# Patient Record
Sex: MALE | Race: WHITE
[De-identification: names, ages, dates, MRNs, and addresses within clinical notes are randomized per-mention and may not be internally consistent; named-entity substitution may affect disease eponyms.]

---

## 2017-01-16 ENCOUNTER — HOSPITAL ENCOUNTER (OUTPATIENT)
Dept: HOSPITAL 61 - PCVCCLINIC | Age: 77
Discharge: HOME | End: 2017-01-16
Attending: INTERNAL MEDICINE
Payer: COMMERCIAL

## 2017-01-16 DIAGNOSIS — I47.2: ICD-10-CM

## 2017-01-16 DIAGNOSIS — I25.5: ICD-10-CM

## 2017-01-16 DIAGNOSIS — I73.9: ICD-10-CM

## 2017-01-16 DIAGNOSIS — E78.00: ICD-10-CM

## 2017-01-16 DIAGNOSIS — I25.10: Primary | ICD-10-CM

## 2017-01-16 DIAGNOSIS — I10: ICD-10-CM

## 2017-01-16 DIAGNOSIS — I48.91: ICD-10-CM

## 2017-01-16 PROCEDURE — 93005 ELECTROCARDIOGRAM TRACING: CPT

## 2017-01-16 PROCEDURE — 80061 LIPID PANEL: CPT

## 2017-01-16 PROCEDURE — G0463 HOSPITAL OUTPT CLINIC VISIT: HCPCS

## 2017-05-22 ENCOUNTER — HOSPITAL ENCOUNTER (OUTPATIENT)
Dept: HOSPITAL 35 - GI | Age: 77
Discharge: HOME | End: 2017-05-22
Attending: SPECIALIST
Payer: COMMERCIAL

## 2017-05-22 VITALS — HEIGHT: 69.02 IN | BODY MASS INDEX: 36.58 KG/M2 | WEIGHT: 247 LBS

## 2017-05-22 DIAGNOSIS — F41.9: ICD-10-CM

## 2017-05-22 DIAGNOSIS — Z95.0: ICD-10-CM

## 2017-05-22 DIAGNOSIS — K63.5: ICD-10-CM

## 2017-05-22 DIAGNOSIS — I50.9: ICD-10-CM

## 2017-05-22 DIAGNOSIS — G47.33: ICD-10-CM

## 2017-05-22 DIAGNOSIS — Z86.010: ICD-10-CM

## 2017-05-22 DIAGNOSIS — I25.10: ICD-10-CM

## 2017-05-22 DIAGNOSIS — I10: ICD-10-CM

## 2017-05-22 DIAGNOSIS — Z12.11: Primary | ICD-10-CM

## 2017-05-22 DIAGNOSIS — Z87.891: ICD-10-CM

## 2017-05-22 DIAGNOSIS — K57.30: ICD-10-CM

## 2017-05-22 DIAGNOSIS — I25.2: ICD-10-CM

## 2017-05-22 DIAGNOSIS — F32.9: ICD-10-CM

## 2017-05-22 DIAGNOSIS — I73.9: ICD-10-CM

## 2017-05-22 DIAGNOSIS — Z98.890: ICD-10-CM

## 2017-05-22 DIAGNOSIS — E78.5: ICD-10-CM

## 2017-05-22 PROCEDURE — 62110: CPT

## 2017-05-22 PROCEDURE — 62900: CPT

## 2017-07-20 ENCOUNTER — HOSPITAL ENCOUNTER (OUTPATIENT)
Dept: HOSPITAL 61 - PCVCIMAG | Age: 77
Discharge: HOME | End: 2017-07-20
Attending: INTERNAL MEDICINE
Payer: COMMERCIAL

## 2017-07-20 DIAGNOSIS — Z87.891: ICD-10-CM

## 2017-07-20 DIAGNOSIS — I71.4: ICD-10-CM

## 2017-07-20 DIAGNOSIS — I25.10: ICD-10-CM

## 2017-07-20 DIAGNOSIS — Z95.1: ICD-10-CM

## 2017-07-20 DIAGNOSIS — I73.9: ICD-10-CM

## 2017-07-20 DIAGNOSIS — Z95.810: ICD-10-CM

## 2017-07-20 DIAGNOSIS — I47.2: ICD-10-CM

## 2017-07-20 DIAGNOSIS — I48.0: ICD-10-CM

## 2017-07-20 DIAGNOSIS — I34.0: Primary | ICD-10-CM

## 2017-07-20 DIAGNOSIS — Z79.01: ICD-10-CM

## 2017-07-20 PROCEDURE — 93005 ELECTROCARDIOGRAM TRACING: CPT

## 2017-07-20 PROCEDURE — G0463 HOSPITAL OUTPT CLINIC VISIT: HCPCS

## 2017-07-20 PROCEDURE — 80061 LIPID PANEL: CPT

## 2017-07-20 PROCEDURE — 93306 TTE W/DOPPLER COMPLETE: CPT

## 2017-07-20 NOTE — PCVCIMAG
--------------- APPROVED REPORT --------------





Study performed:  07/20/2017 08:51:01



EXAM: Comprehensive 2D, Doppler, and color-flow 

Echocardiogram



Other Information 

Study Quality: Technically 

Limited



Indications

CAD

Hypertension/HDD

CABG, Defib, Sleep Apnea



2D Dimensions

LVEF(%):  15.12 (&gt;50%)

IVSd:  12.36 (7-11mm)LVOT Diam:  26.93 (18-24mm) 

LVDd:  70.36 mm

PWd:  8.18 (7-11mm)

LVDs:  65.44 (25-40mm)

Left Atrium:  52.01 (27-40mm)

Aortic Root:  39.13 mm

LV Single Plane 4CH:  13.08 %

LV Single Plane 2CH:  28.18 %Smith's LVEF:  20.63 %

Biplane EF:  24.1 %



Volumes

Left Atrial Volume (Systole)

Single Plane 4CH:  53.25 mLSingle Plane 2CH:  61.19 mL

LA ESV Index:  24.00 mL/m2



Aortic Valve

AoV Peak Mohamud.:  1.22 m/s

AO Peak Gr.:  5.97 mmHgLVOT Max PG:  3.93 mmHg

LVOT Max V:  0.96 m/s

DANNY Vmax: 4.46 cm2



Mitral Valve

E/A Ratio:  0.0

MV Decel. Time:  180.15 ms

MV E Max Mohamud.:  0.62 m/s

MV A Mohamud.:  0.00 m/s

IVRT:  114.19 ms



Tricuspid Valve

TR Peak Mohamud.:  3.02 m/s

TR Peak Gr.:  36.37 mmHg



Left Ventricle

The left ventricle is dilated. There is normal left ventricular wall 

thickness. Left ventricular ejection fraction is severely decreased. 

LVEF is 30-35%. Grade I diastolic dysfunction



Right Ventricle

The right ventricle is normal size. Pacemaker wire is noted. The 

right ventricular systolic function is normal.



Atria

The left atrium size is normal. The right atrium size is normal. 

Pacemaker wire is noted.



Aortic Valve

The aortic valve is normal in structure. No aortic regurgitation is 

present. There is no aortic valvular stenosis.



Mitral Valve

The mitral valve is normal in structure. Mild mitral valve 

regurgitation No evidence of mitral valve stenosis.



Tricuspid Valve

The tricuspid valve is normal in structure. There is no tricuspid 

valve regurgitation noted.



Pulmonic Valve

The pulmonary valve is normal in structure. There is no pulmonic 

valvular regurgitation.



Great Vessels

The aortic root is normal in size. IVC is normal in size and 

collapses with &gt;50% inspiration



Pericardium

There is no pericardial effusion.



&lt;Conclusion&gt;

Left ventricular ejection fraction is severely decreased.

LVEF is 30-35%. Grade I diastolic dysfunction

The aortic valve is normal in structure. No aortic regurgitation or 

stenosis.

The mitral valve is normal in structure. Mild mitral valve 

regurgitation

Pulmonary artery pressure could not be reliably ascertained

There is no pericardial effusion.

## 2018-01-25 ENCOUNTER — HOSPITAL ENCOUNTER (OUTPATIENT)
Dept: HOSPITAL 61 - PCVCCLINIC | Age: 78
Discharge: HOME | End: 2018-01-25
Attending: INTERNAL MEDICINE
Payer: COMMERCIAL

## 2018-01-25 DIAGNOSIS — I47.2: ICD-10-CM

## 2018-01-25 DIAGNOSIS — Z95.810: ICD-10-CM

## 2018-01-25 DIAGNOSIS — I25.5: ICD-10-CM

## 2018-01-25 DIAGNOSIS — Z87.891: ICD-10-CM

## 2018-01-25 DIAGNOSIS — Z79.899: ICD-10-CM

## 2018-01-25 DIAGNOSIS — G47.33: ICD-10-CM

## 2018-01-25 DIAGNOSIS — E78.5: ICD-10-CM

## 2018-01-25 DIAGNOSIS — I25.10: Primary | ICD-10-CM

## 2018-01-25 DIAGNOSIS — Z79.01: ICD-10-CM

## 2018-01-25 DIAGNOSIS — Z95.1: ICD-10-CM

## 2018-01-25 DIAGNOSIS — I48.0: ICD-10-CM

## 2018-01-25 PROCEDURE — 80061 LIPID PANEL: CPT

## 2018-01-25 PROCEDURE — 93005 ELECTROCARDIOGRAM TRACING: CPT

## 2018-06-08 ENCOUNTER — HOSPITAL ENCOUNTER (OUTPATIENT)
Dept: HOSPITAL 61 - PCVCCLINIC | Age: 78
Discharge: HOME | End: 2018-06-08
Attending: INTERNAL MEDICINE
Payer: COMMERCIAL

## 2018-06-08 DIAGNOSIS — Z95.1: ICD-10-CM

## 2018-06-08 DIAGNOSIS — G47.33: ICD-10-CM

## 2018-06-08 DIAGNOSIS — Z88.8: ICD-10-CM

## 2018-06-08 DIAGNOSIS — I48.0: ICD-10-CM

## 2018-06-08 DIAGNOSIS — Z95.810: ICD-10-CM

## 2018-06-08 DIAGNOSIS — E78.5: ICD-10-CM

## 2018-06-08 DIAGNOSIS — I25.10: Primary | ICD-10-CM

## 2018-06-08 DIAGNOSIS — I25.5: ICD-10-CM

## 2018-06-08 DIAGNOSIS — I47.2: ICD-10-CM

## 2018-06-08 DIAGNOSIS — Z79.01: ICD-10-CM

## 2018-06-08 PROCEDURE — 93005 ELECTROCARDIOGRAM TRACING: CPT

## 2019-06-20 ENCOUNTER — HOSPITAL ENCOUNTER (OUTPATIENT)
Dept: HOSPITAL 61 - PCVCIMAG | Age: 79
Discharge: HOME | End: 2019-06-20
Attending: INTERNAL MEDICINE
Payer: COMMERCIAL

## 2019-06-20 DIAGNOSIS — I48.0: ICD-10-CM

## 2019-06-20 DIAGNOSIS — I25.10: Primary | ICD-10-CM

## 2019-06-20 DIAGNOSIS — I25.5: ICD-10-CM

## 2019-06-20 PROCEDURE — 93017 CV STRESS TEST TRACING ONLY: CPT

## 2019-06-20 PROCEDURE — 78452 HT MUSCLE IMAGE SPECT MULT: CPT

## 2019-06-20 PROCEDURE — A9500 TC99M SESTAMIBI: HCPCS

## 2019-06-20 NOTE — PCVCIMAG
--------------- APPROVED REPORT --------------





Imaging Protocol: Rest Tc-99m/Stress Tc-99m 1 day

Study performed:  06/20/2019 08:54:16



Indication: Atrial Fibrillation, CAD, ICM

Patient Location: Out-Patient

Stress Nurse: Maryan Lawson RN, Patricia Ho RN

NM Tech:Radha Carrasquillotalon [facility]



Ht: 5 ft 9 in Wt: 230 lbs BSA:  2.19 m2

HR: 64 bpm                      BP: 120/75 mmHg         BMI:  

33.9

Rhythm:  AV paced rhythm with PVC'S



Medical History

Medical History: Hyperlipidemia, HTN, P-Atrial 

Fibrillation

Medications: Pradaxa, Lisinopril, Paxil, Prednisone, Sotalol (took 

this am) Torsemide

Allergies: Statins

Cardiac Risk Factors: Age

Previous Cardiac Procedures: 1991 CABG

Pretest Chest Pain Characteristics: No chest pain

Exercise History: Sedentary

Physical Disabilities: Weakness



Resting Data

Rest SPECT myocardial perfusion imaging was performed in supine 

position 45 minutes following the intravenous injection of 10.5 mCi 

of Tc-99m Sestamibi.

Time of rest injection: 0900     Date: 06/20/2019

Administration Route: IV

Administration Site: Right Hand



Pharmacologic Stress

Pharmacologic stress test was performed by injecting Regadenoson 0.4 

mg IV push over 10-15 seconds immediately followed by the intravenous 

injection of 33.3 mCi of Tc-99m Sestamibi.

Time of stress injection: 1030     Date: 06/20/2019

Administration Route: IV

Administration Site: Right Hand

Gated Stress SPECT was performed 45 minutes after stress 

injection.

The images were gated to evaluate regional wall motion and calculate 

left ventricular ejection fraction. 



Stress Test Details

Stress Test:  Pharmacologic stress testing performed using 0.4 mg of 

regadenoson per 5 mL given IV over 10 seconds.

  Reason for pharmacologic stress test: general weakness.



HRMax Heart Rate (APMHR): 143 bpm 

Resting HR:            64 bpmTarget HR (85% APMHR): 121 bpm

Max HR Achieved:  73 bpm

% of APMHR:         51

Recovery HR:            62 bpm



BP

Resting BP:  120/75 mmHg

Max BP:       121/75 mmHg

Recovery BP:       118/65 mmHg

ECG

Resting ECG:  Sinus Rhythm, LAFB, RBBB

Stress ECG:     Sinus Rhythm, LAFB, RBBB

ST Change: None

Maximum ST Deviation: 0 mm

Arrhythmia:    PVC'S

Recovery ECG: Sinus Rhythm, LAFB, RBBB

Recovery ST Change: Sinus Rhythm, LAFB, RBBB

Recovery ST Deviation: 0 mm

Recovery Arrhythmia: VPC



Clinical

Reason for Termination: Completed protocol

Stress Symptoms: None



Stress ECG Conclusion

ECG: Non-ischemic

Clinical: Non-ischemic



Study Quality

Study: Good



Study Data

Post stress, the left ventricular ejection was 25%..

SSS: 19

SRS: 24

SDS: 0

TID = 0.96.



Perfusion

No evidence of stress induced ischemia. 

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 

ventricle with no marcelino-infarct ischemia.



Wall Motion

Severely decreased left ventricular systolic function.



Nuclear Conclusion

No evidence of stress induced ischemia. 

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 

ventricle with no marcelino-infarct ischemia.

Post stress, the left ventricular ejection was 25%.. 

No change since prior study dated July 2015.



Interpreted by:  Poncho Nice MD

Electronically Approved: 06/20/2019 

16:41:32



<Conclusion>

ECG: Non-ischemic

Clinical: Non-ischemic

## 2019-12-16 ENCOUNTER — HOSPITAL ENCOUNTER (OUTPATIENT)
Dept: HOSPITAL 35 - RAD | Age: 79
End: 2019-12-16
Attending: INTERNAL MEDICINE
Payer: COMMERCIAL

## 2019-12-16 DIAGNOSIS — Z98.890: ICD-10-CM

## 2019-12-16 DIAGNOSIS — J98.4: Primary | ICD-10-CM

## 2019-12-16 DIAGNOSIS — Z95.0: ICD-10-CM

## 2020-06-17 ENCOUNTER — HOSPITAL ENCOUNTER (OUTPATIENT)
Dept: HOSPITAL 35 - SJCVCIMAG | Age: 80
End: 2020-06-17
Attending: INTERNAL MEDICINE
Payer: COMMERCIAL

## 2020-06-17 DIAGNOSIS — Z79.899: ICD-10-CM

## 2020-06-17 DIAGNOSIS — R94.31: ICD-10-CM

## 2020-06-17 DIAGNOSIS — Z82.49: ICD-10-CM

## 2020-06-17 DIAGNOSIS — I45.10: ICD-10-CM

## 2020-06-17 DIAGNOSIS — I77.810: ICD-10-CM

## 2020-06-17 DIAGNOSIS — E78.5: ICD-10-CM

## 2020-06-17 DIAGNOSIS — I25.810: ICD-10-CM

## 2020-06-17 DIAGNOSIS — I11.9: ICD-10-CM

## 2020-06-17 DIAGNOSIS — I08.1: Primary | ICD-10-CM

## 2020-06-17 DIAGNOSIS — I47.2: ICD-10-CM

## 2020-06-17 DIAGNOSIS — I25.5: ICD-10-CM

## 2020-06-17 DIAGNOSIS — Z95.810: ICD-10-CM

## 2020-06-17 DIAGNOSIS — I48.0: ICD-10-CM

## 2020-06-17 DIAGNOSIS — G47.33: ICD-10-CM

## 2020-06-17 DIAGNOSIS — Z87.891: ICD-10-CM

## 2020-06-17 DIAGNOSIS — E78.00: ICD-10-CM

## 2020-06-17 DIAGNOSIS — Z95.1: ICD-10-CM

## 2020-06-17 DIAGNOSIS — Z99.89: ICD-10-CM

## 2020-06-22 ENCOUNTER — HOSPITAL ENCOUNTER (OUTPATIENT)
Dept: HOSPITAL 35 - SJCVCIMAG | Age: 80
End: 2020-06-22
Attending: INTERNAL MEDICINE
Payer: COMMERCIAL

## 2020-06-22 DIAGNOSIS — I71.4: ICD-10-CM

## 2020-06-22 DIAGNOSIS — K76.89: Primary | ICD-10-CM

## 2020-06-22 DIAGNOSIS — N28.1: ICD-10-CM

## 2020-08-03 ENCOUNTER — HOSPITAL ENCOUNTER (OUTPATIENT)
Dept: HOSPITAL 35 - LAB | Age: 80
End: 2020-08-03
Payer: COMMERCIAL

## 2020-08-03 DIAGNOSIS — Z11.59: ICD-10-CM

## 2020-08-03 DIAGNOSIS — Z01.812: Primary | ICD-10-CM

## 2020-08-07 ENCOUNTER — HOSPITAL ENCOUNTER (OUTPATIENT)
Dept: HOSPITAL 35 - GI | Age: 80
Discharge: HOME | End: 2020-08-07
Attending: SPECIALIST
Payer: COMMERCIAL

## 2020-08-07 VITALS — WEIGHT: 245 LBS | HEIGHT: 69.02 IN | BODY MASS INDEX: 36.29 KG/M2
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
## 2020-08-07 DIAGNOSIS — Z79.01: ICD-10-CM

## 2020-08-07 DIAGNOSIS — Z79.899: ICD-10-CM

## 2020-08-07 DIAGNOSIS — K31.9: ICD-10-CM

## 2020-08-07 DIAGNOSIS — F32.9: ICD-10-CM
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
## 2020-08-07 DIAGNOSIS — K29.70: ICD-10-CM

## 2020-08-07 DIAGNOSIS — Z88.8: ICD-10-CM

## 2020-08-07 DIAGNOSIS — I25.10: ICD-10-CM

## 2020-08-07 DIAGNOSIS — I11.0: ICD-10-CM

## 2020-08-07 DIAGNOSIS — R10.9: Primary | ICD-10-CM

## 2020-08-07 DIAGNOSIS — Z98.890: ICD-10-CM
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
## 2020-08-07 DIAGNOSIS — Z98.42: ICD-10-CM

## 2020-08-07 DIAGNOSIS — I42.9: ICD-10-CM

## 2020-08-07 DIAGNOSIS — Z95.1: ICD-10-CM

## 2020-08-07 DIAGNOSIS — E78.5: ICD-10-CM
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
## 2020-08-07 DIAGNOSIS — T18.2XXA: ICD-10-CM

## 2020-08-07 DIAGNOSIS — I48.91: ICD-10-CM

## 2020-08-07 DIAGNOSIS — Z98.41: ICD-10-CM

## 2020-08-07 DIAGNOSIS — G47.30: ICD-10-CM

## 2020-08-07 DIAGNOSIS — Z95.0: ICD-10-CM

## 2020-08-07 DIAGNOSIS — I50.9: ICD-10-CM

## 2020-08-07 PROCEDURE — 62900: CPT

## 2020-08-07 PROCEDURE — 62110: CPT

## 2020-08-11 NOTE — PATH
[facility]
[address]
[place]   [address]                     PATHOLOGY RPT PROCEDURE       
_______________________________________________________________________________
 
Name:       FELIZ MILLER              Room #:                     REG DONTA SWANN.#:      [id number]     Account #:      [id number]  
Admission:  08/07/20    Date of Birth:  [birth year]  
Discharge:                                              Report #:    [id number]
                                                        Path Case #: [id number]
_______________________________________________________________________________
 
[facility] Accession Number: [id number]
.                                                                01
Material submitted:                                        .
stomach - BX OF GASTRITIS
.                                                                01
Clinical history:                                          .
Abd pain
R/O H. pylori
.                                                                02
**********************************************************************
Diagnosis:
Gastric mucosa, gastritis R/O H. pylori, endoscopic biopsy:
- Moderate reactive gastropathy.
- Negative for intestinal metaplasia or atrophy.
- Negative for Helicobacter pylori (properly controlled
immunohistochemical stain performed).
(IUV:lay; 08/11/2020)
QMS  08/11/2020  1439 Local
**********************************************************************
.                                                                02
Electronically signed:                                     .
Thu Maria MD, Pathologist
NPI- [id number]
.                                                                01
Gross description:                                         .
The specimen is received in formalin, labeled "Feliz Miller, BX of
gastritis" and consists of multiple fragments of tan tissue measuring 1.0
x 0.4 x 0.2 cm in aggregate which are entirely submitted in A1.
(SDY; 8/10/2020)
SYU/SYU  08/10/2020  1234 Local
.                                                                02
Pathologist provided ICD-10:
K31.9
.                                                                02
CPT                                                        .
947462, [id number]
Specimen Comment: A courtesy copy of this report has been sent to [phone],
[phone]
Specimen Comment: [phone]
Specimen Comment: Report sent to  / 
***Performed at:  01
   [address]  [id number]
   MD Ramin Forde MD Phone:  [phone]
***Performed at:  02
   [address]  [id number]
 
 
[address]   [address]                     PATHOLOGY RPT PROCEDURE       
_______________________________________________________________________________
 
Name:       FELIZ MILLER              Room #:                     REG DONTA SWANN.#:      [id number]     Account #:      [id number]  
Admission:  08/07/20    Date of Birth:  [birth year]  
Discharge:                                              Report #:    [id number]
                                                        Path Case #: [id number]
_______________________________________________________________________________
   MD Thu Maria MD Phone:  [phone]

## 2020-08-13 NOTE — P
[facility]
[address]
[place]   [address]                     PROCEDURE REPORT              
_______________________________________________________________________________
 
Name:       ANNE CARRINGTON              Room #:                     REG [facility]#:      [id number]                       Account #:      [id number]  
Admission:  08/07/20    Attend Phys:    Nii Almendarez MD   
Discharge:              Date of Birth:  [birth year]  
                                                          Report #: [id number]
                                                                    [id number]   
_______________________________________________________________________________
THIS REPORT FOR:  
 
cc:  Cuauhtemoc Lam MD, Eric K. MD Thesing,Nii KENNEY MD                                           ~
CC: Cuauhtemoc Almendarez
 
DATE OF SERVICE:  08/07/2020
 
 
OUTPATIENT UPPER ENDOSCOPY REPORT
 
BRIEF HISTORY:  The patient is a 79-year-old male who presented to the office
with complaints of abdominal pain.  He has also had some nausea and has had
bouts of vomiting.  He does have a history of significant heart disease with
cardiomyopathy and also COPD.  I am not aware a history of diabetes.
 
PREOPERATIVE DIAGNOSES:  Persistent abdominal pain as well as bouts of nausea
and vomiting.
 
POSTOPERATIVE DIAGNOSES:
1.  Phytobezoar, stomach.
2.  Diffuse gastritis.
 
MEDICATIONS:  Deep sedation with propofol per anesthesia.
 
SPECIMEN:  Biopsies of gastritis.
 
ESTIMATED BLOOD LOSS:  3 mL.
 
PROCEDURE:  Esophagogastroduodenoscopy with biopsy.
 
FINDINGS:  Prior to propofol sedation, procedure of upper endoscopy discussed
with the patient as well as potential risks and its complications.  He indicates
he understands and desires to proceed.
 
DESCRIPTION OF PROCEDURE:  With the patient in left lateral decubitus position,
the Olympus video endoscope was inserted in the cervical esophagus under direct
vision without difficulty.  Examination of this organ through its entire length
revealed normal esophageal mucosa down the squamocolumnar junction.  The
squamocolumnar junction was inspected and noted to be unremarkable.  I saw him
in the office, I speculated that he may have had a large hiatus hernia.  A
significant hiatus hernia was not seen.  The scope was advanced fully into the
stomach and we encountered a moderately large bezoar in the body of the stomach.
 
 
 
[facility]
[address]
[place]   [address]                     PROCEDURE REPORT              
_______________________________________________________________________________
 
Name:       ASHLYELIZABETHANNE TATO              Room #:                     REG [facility]#:      [id number]                       Account #:      [id number]  
Admission:  08/07/20    Attend Phys:    Nii Almendarez MD   
Discharge:              Date of Birth:  [birth year]  
                                                          Report #: [id number]
                                                                    [id number]   
_______________________________________________________________________________
 A significant amount of fluid was not seen in the stomach.  The stomach was
examined on end view as well as retroflexed views.  There were also bits of food
material scattered around the stomach.  Within these limitations, I did not see
any ulcers or neoplastic lesions.  There is no evidence of obstruction.  On
retroflexion, no obvious mass lesion was seen.  The scope was advanced through
the pylorus was unremarkable.  There was food debris within the duodenal bulb
and we very carefully passed through this material and there was no evidence of
stricturing.  We were able to advance the scope to the third portion of the
duodenum.  Upon slow withdrawal of the scope, the duodenal papilla was
identified.  As we withdrew the scope, no additional abnormalities were seen. 
As we withdrew the scope through the stomach, multiple biopsies were obtained to
evaluate for H. pylori.  Again, there was no evidence of ulcer disease or
obstruction.  Scope was withdrawn.  The patient tolerated the procedure well.
 
CONDITION OF THE PATIENT UPON DISCHARGE:  Following procedure, the patient
drowsy and arousable.  He will be discharged home when fully ambulatory.
 
INSTRUCTIONS TO THE PATIENT AND FAMILY AT THE TIME OF DISCHARGE:  The patient
has evidence of a fairly large gastric bezoar.  I do not find evidence of
obstruction.  At this point in time, we will have him take clear liquids for the
next 2-3 days.  We will also start him on metoclopramide 10 mg a.c. and at
bedtime in the liquid form and have return for followup in the office in about
3-4 weeks.  My plan at this point in time is to use the metoclopramide on the
short-term bases.  We will also have him see a dietitian with regard to his
gastric bezoar.
 
 
 
 
 
 
 
 
 
 
 
 
 
 
 
 
 
 
 
  <ELECTRONICALLY SIGNED>
   By: Nii Almendarez MD           
  08/13/20     0901
D: 08/07/20 1047                           _____________________________________
T: 08/07/20 1128                           Nii Almendarez MD             /nt

## 2020-12-01 ENCOUNTER — HOSPITAL ENCOUNTER (OUTPATIENT)
Dept: HOSPITAL 35 - SJCVC | Age: 80
End: 2020-12-01
Attending: INTERNAL MEDICINE
Payer: COMMERCIAL

## 2020-12-01 DIAGNOSIS — Z95.0: ICD-10-CM

## 2020-12-01 DIAGNOSIS — Z95.1: ICD-10-CM

## 2020-12-01 DIAGNOSIS — I25.5: ICD-10-CM

## 2020-12-01 DIAGNOSIS — I48.0: ICD-10-CM

## 2020-12-01 DIAGNOSIS — R94.31: Primary | ICD-10-CM

## 2020-12-01 DIAGNOSIS — I45.10: ICD-10-CM

## 2020-12-01 DIAGNOSIS — Z79.899: ICD-10-CM

## 2020-12-01 DIAGNOSIS — I47.2: ICD-10-CM

## 2020-12-01 DIAGNOSIS — Z79.82: ICD-10-CM

## 2020-12-01 DIAGNOSIS — Z87.891: ICD-10-CM

## 2020-12-03 ENCOUNTER — HOSPITAL ENCOUNTER (OUTPATIENT)
Dept: HOSPITAL 35 - LAB | Age: 80
End: 2020-12-03
Attending: INTERNAL MEDICINE
Payer: COMMERCIAL

## 2020-12-03 DIAGNOSIS — Z20.828: ICD-10-CM

## 2020-12-03 DIAGNOSIS — Z01.812: Primary | ICD-10-CM

## 2020-12-07 ENCOUNTER — HOSPITAL ENCOUNTER (OUTPATIENT)
Dept: HOSPITAL 35 - CATH | Age: 80
Discharge: HOME | End: 2020-12-07
Attending: INTERNAL MEDICINE
Payer: COMMERCIAL

## 2020-12-07 VITALS — SYSTOLIC BLOOD PRESSURE: 97 MMHG | DIASTOLIC BLOOD PRESSURE: 50 MMHG

## 2020-12-07 VITALS — BODY MASS INDEX: 35.55 KG/M2 | WEIGHT: 240 LBS | HEIGHT: 69 IN

## 2020-12-07 DIAGNOSIS — E78.5: ICD-10-CM

## 2020-12-07 DIAGNOSIS — I25.5: ICD-10-CM

## 2020-12-07 DIAGNOSIS — I25.2: ICD-10-CM

## 2020-12-07 DIAGNOSIS — Z79.01: ICD-10-CM

## 2020-12-07 DIAGNOSIS — I11.0: ICD-10-CM

## 2020-12-07 DIAGNOSIS — I48.91: ICD-10-CM

## 2020-12-07 DIAGNOSIS — G47.30: ICD-10-CM

## 2020-12-07 DIAGNOSIS — Z79.899: ICD-10-CM

## 2020-12-07 DIAGNOSIS — Z98.890: ICD-10-CM

## 2020-12-07 DIAGNOSIS — Z45.02: Primary | ICD-10-CM

## 2020-12-07 DIAGNOSIS — Z98.42: ICD-10-CM

## 2020-12-07 DIAGNOSIS — I25.10: ICD-10-CM

## 2020-12-07 DIAGNOSIS — E66.09: ICD-10-CM

## 2020-12-07 DIAGNOSIS — K21.9: ICD-10-CM

## 2020-12-07 DIAGNOSIS — Z88.8: ICD-10-CM

## 2020-12-07 DIAGNOSIS — Z87.891: ICD-10-CM

## 2020-12-07 DIAGNOSIS — I50.9: ICD-10-CM

## 2020-12-07 DIAGNOSIS — I47.2: ICD-10-CM

## 2020-12-07 DIAGNOSIS — F32.9: ICD-10-CM

## 2020-12-07 DIAGNOSIS — Z98.41: ICD-10-CM

## 2020-12-07 LAB
ALBUMIN SERPL-MCNC: 4 G/DL (ref 3.4–5)
ALT SERPL-CCNC: 21 U/L (ref 30–65)
ANION GAP SERPL CALC-SCNC: 8 MMOL/L (ref 7–16)
APTT BLD: 27.8 SECONDS (ref 24.5–32.8)
AST SERPL-CCNC: 20 U/L (ref 15–37)
BASOPHILS NFR BLD AUTO: 0.4 % (ref 0–2)
BILIRUB SERPL-MCNC: 0.5 MG/DL (ref 0.2–1)
BUN SERPL-MCNC: 16 MG/DL (ref 7–18)
CALCIUM SERPL-MCNC: 9 MG/DL (ref 8.5–10.1)
CHLORIDE SERPL-SCNC: 104 MMOL/L (ref 98–107)
CO2 SERPL-SCNC: 29 MMOL/L (ref 21–32)
CREAT SERPL-MCNC: 1 MG/DL (ref 0.7–1.3)
EOSINOPHIL NFR BLD: 5.6 % (ref 0–3)
ERYTHROCYTE [DISTWIDTH] IN BLOOD BY AUTOMATED COUNT: 14.3 % (ref 10.5–14.5)
GLUCOSE SERPL-MCNC: 99 MG/DL (ref 74–106)
GRANULOCYTES NFR BLD MANUAL: 47 % (ref 36–66)
HCT VFR BLD CALC: 40.5 % (ref 42–52)
HGB BLD-MCNC: 13.1 GM/DL (ref 14–18)
INR PPP: 1
LYMPHOCYTES NFR BLD AUTO: 38.8 % (ref 24–44)
MCH RBC QN AUTO: 27.3 PG (ref 26–34)
MCHC RBC AUTO-ENTMCNC: 32.3 G/DL (ref 28–37)
MCV RBC: 84.7 FL (ref 80–100)
MONOCYTES NFR BLD: 8.2 % (ref 1–8)
NEUTROPHILS # BLD: 2.6 THOU/UL (ref 1.4–8.2)
PLATELET # BLD: 156 THOU/UL (ref 150–400)
POTASSIUM SERPL-SCNC: 4.3 MMOL/L (ref 3.5–5.1)
PROT SERPL-MCNC: 7.4 G/DL (ref 6.4–8.2)
PROTHROMBIN TIME: 10.7 SECONDS (ref 9.3–11.4)
RBC # BLD AUTO: 4.78 MIL/UL (ref 4.5–6)
SODIUM SERPL-SCNC: 141 MMOL/L (ref 136–145)
WBC # BLD AUTO: 5.6 THOU/UL (ref 4–11)

## 2020-12-07 PROCEDURE — 70005: CPT

## 2020-12-07 PROCEDURE — 62900: CPT

## 2020-12-07 PROCEDURE — 62110: CPT

## 2020-12-17 ENCOUNTER — HOSPITAL ENCOUNTER (OUTPATIENT)
Dept: HOSPITAL 35 - SJCVC | Age: 80
End: 2020-12-17
Attending: INTERNAL MEDICINE
Payer: COMMERCIAL

## 2020-12-17 DIAGNOSIS — Z95.1: ICD-10-CM

## 2020-12-17 DIAGNOSIS — Z95.810: ICD-10-CM

## 2020-12-17 DIAGNOSIS — I47.2: ICD-10-CM

## 2020-12-17 DIAGNOSIS — I25.5: ICD-10-CM

## 2020-12-17 DIAGNOSIS — G47.33: ICD-10-CM

## 2020-12-17 DIAGNOSIS — Z87.891: ICD-10-CM

## 2020-12-17 DIAGNOSIS — E78.5: ICD-10-CM

## 2020-12-17 DIAGNOSIS — I25.10: Primary | ICD-10-CM

## 2020-12-17 DIAGNOSIS — I48.0: ICD-10-CM

## 2020-12-17 DIAGNOSIS — Z99.89: ICD-10-CM

## 2020-12-17 DIAGNOSIS — Z79.899: ICD-10-CM

## 2021-06-17 ENCOUNTER — HOSPITAL ENCOUNTER (OUTPATIENT)
Dept: HOSPITAL 35 - SJCVC | Age: 81
End: 2021-06-17
Attending: INTERNAL MEDICINE
Payer: COMMERCIAL

## 2021-06-17 DIAGNOSIS — I48.0: ICD-10-CM

## 2021-06-17 DIAGNOSIS — Z88.8: ICD-10-CM

## 2021-06-17 DIAGNOSIS — R94.31: Primary | ICD-10-CM

## 2021-06-17 DIAGNOSIS — I25.10: ICD-10-CM

## 2021-06-17 DIAGNOSIS — I73.9: ICD-10-CM

## 2021-06-17 DIAGNOSIS — Z79.899: ICD-10-CM

## 2021-06-17 DIAGNOSIS — Z87.891: ICD-10-CM

## 2021-06-17 DIAGNOSIS — I45.10: ICD-10-CM

## 2021-06-17 DIAGNOSIS — G47.33: ICD-10-CM

## 2021-06-17 DIAGNOSIS — Z95.1: ICD-10-CM

## 2021-06-17 DIAGNOSIS — I34.0: ICD-10-CM

## 2021-06-17 DIAGNOSIS — I47.2: ICD-10-CM

## 2021-06-17 DIAGNOSIS — I25.5: ICD-10-CM

## 2021-06-17 DIAGNOSIS — Z72.89: ICD-10-CM

## 2021-06-17 DIAGNOSIS — Z95.810: ICD-10-CM

## 2021-06-17 DIAGNOSIS — Z82.49: ICD-10-CM

## 2021-06-17 DIAGNOSIS — Z99.89: ICD-10-CM

## 2021-06-17 DIAGNOSIS — I45.89: ICD-10-CM

## 2021-06-17 DIAGNOSIS — E78.5: ICD-10-CM

## 2021-07-01 ENCOUNTER — HOSPITAL ENCOUNTER (OUTPATIENT)
Dept: HOSPITAL 35 - SJCVCIMAG | Age: 81
End: 2021-07-01
Attending: INTERNAL MEDICINE
Payer: COMMERCIAL

## 2021-07-01 DIAGNOSIS — I48.0: ICD-10-CM

## 2021-07-01 DIAGNOSIS — R53.83: ICD-10-CM

## 2021-07-01 DIAGNOSIS — I42.9: ICD-10-CM

## 2021-07-01 DIAGNOSIS — I77.819: ICD-10-CM

## 2021-07-01 DIAGNOSIS — I49.3: ICD-10-CM

## 2021-07-01 DIAGNOSIS — I25.5: ICD-10-CM

## 2021-07-01 DIAGNOSIS — Z79.899: ICD-10-CM

## 2021-07-01 DIAGNOSIS — Z95.1: ICD-10-CM

## 2021-07-01 DIAGNOSIS — I25.10: ICD-10-CM

## 2021-07-01 DIAGNOSIS — Z87.891: ICD-10-CM

## 2021-07-01 DIAGNOSIS — I44.0: ICD-10-CM

## 2021-07-01 DIAGNOSIS — I08.1: Primary | ICD-10-CM
